# Patient Record
Sex: FEMALE | Race: WHITE
[De-identification: names, ages, dates, MRNs, and addresses within clinical notes are randomized per-mention and may not be internally consistent; named-entity substitution may affect disease eponyms.]

---

## 2021-03-28 ENCOUNTER — HOSPITAL ENCOUNTER (EMERGENCY)
Dept: HOSPITAL 63 - ER | Age: 31
Discharge: HOME | End: 2021-03-28
Payer: COMMERCIAL

## 2021-03-28 VITALS — BODY MASS INDEX: 37.96 KG/M2 | HEIGHT: 68 IN | WEIGHT: 250.45 LBS

## 2021-03-28 VITALS — SYSTOLIC BLOOD PRESSURE: 144 MMHG | DIASTOLIC BLOOD PRESSURE: 95 MMHG

## 2021-03-28 DIAGNOSIS — Z88.1: ICD-10-CM

## 2021-03-28 DIAGNOSIS — Z88.0: ICD-10-CM

## 2021-03-28 DIAGNOSIS — M62.830: ICD-10-CM

## 2021-03-28 DIAGNOSIS — G89.29: ICD-10-CM

## 2021-03-28 DIAGNOSIS — M54.5: Primary | ICD-10-CM

## 2021-03-28 PROCEDURE — 99283 EMERGENCY DEPT VISIT LOW MDM: CPT

## 2021-03-28 NOTE — PHYS DOC
Past History


Past Medical History:  No Pertinent History


 (RACHEL JIMENEZ)


Past Surgical History:  No Surgical History


 (RACHEL JIMENEZ)


Alcohol Use:  None


 (RACHEL JIMENEZ)





General Adult


EDM:


Chief Complaint:  BACK PAIN OR INJURY





HPI:


HPI:





Patient is a 31-year-old female who presents with lower back pain.  Patient st

ates that she was getting her son a bath when she went to stand up she started 

having spasms in her back.  Patient states that she had to crawl to the living 

room and call her mom to come get her to bring her to the emergency room.  

Patient denies taking anything for pain prior to arrival.  Patient was able to 

ambulate on her own into the emergency room.  Patient does have a history of 

chronic back pain.


 (RACHEL JIMENEZ)





Review of Systems:


Review of Systems:


Constitutional:  Denies fever or chills 


Eyes:  Denies change in visual acuity 


HENT:  Denies nasal congestion or sore throat 


Respiratory:  Denies cough or shortness of breath 


Cardiovascular:  Denies chest pain or edema 


GI:  Denies abdominal pain, nausea, vomiting, bloody stools or diarrhea 


: Denies dysuria 


Musculoskeletal: Reports back pain denies joint pain 


Integument:  Denies rash 


Neurologic:  Denies headache, focal weakness or sensory changes 


Endocrine:  Denies polyuria or polydipsia 


Lymphatic:  Denies swollen glands 


Psychiatric:  Denies depression or anxiety


 (RACHEL JIMENEZ)





Allergies:


Allergies:





Allergies








Coded Allergies Type Severity Reaction Last Updated Verified


 


  Penicillins Allergy Unknown  3/28/21 Yes


 


  cefdinir Allergy Unknown  3/28/21 Yes








 (RACHEL JIMENEZ)





Physical Exam:


PE:





Constitutional: Well developed, well nourished, no acute distress, non-toxic 

appearance. []


HENT: Normocephalic, atraumatic, bilateral external ears normal, oropharynx 

moist, no oral exudates, nose normal. []


Eyes: PERRLA, EOMI, conjunctiva normal, no discharge. [] 


Neck: Normal range of motion, no tenderness, supple, no stridor. [] 


Cardiovascular:Heart rate regular rhythm, no murmur []


Lungs & Thorax:  Bilateral breath sounds clear to auscultation []


Abdomen: Bowel sounds normal, soft, no tenderness, no masses, no pulsatile 

masses. [] 


Skin: Warm, dry, no erythema, no rash. [] 


Back: Lower back tenderness, no CVA tenderness. [] 


Extremities: No tenderness, no cyanosis, no clubbing, ROM intact, no edema. [] 


Neurologic: Alert and oriented X 3, normal motor function, normal sensory 

function, no focal deficits noted. []


Psychologic: Affect normal, judgement normal, mood normal. []


 (RACHEL JIMENEZ)





Current Patient Data:


Vital Signs:





                                   Vital Signs








  Date Time  Temp Pulse Resp B/P (MAP) Pulse Ox O2 Delivery O2 Flow Rate FiO2


 


3/28/21 18:24  98 18 144/95 (111) 100   








 (RACHEL JIMENEZ)





EKG:


EKG:


[]


 (RACHEL JIMENEZ)





Radiology/Procedures:


Radiology/Procedures:


[]


 (RACHEL JIMEENZ)





Heart Score:


C/O Chest Pain:  No


Risk Factors:


Risk Factors:  DM, Current or recent (<one month) smoker, HTN, HLP, family 

history of CAD, obesity.


Risk Scores:


Score 0 - 3:  2.5% MACE over next 6 weeks - Discharge Home


Score 4 - 6:  20.3% MACE over next 6 weeks - Admit for Clinical Observation


Score 7 - 10:  72.7% MACE over next 6 weeks - Early Invasive Strategies


 (RACHEL JIMENEZ)





Course & Med Decision Making:


Course & Med Decision Making


Pertinent Labs and Imaging studies reviewed. (See chart for details)





[] Patient is reporting lower back pain and mid back pain.  Patient states that 

she was having spasms and had to crawl from the bathroom to the living room 

floor.  Patient called her mom to bring her to the emergency room.  Patient does

 states she was able to ambulate on her own into the emergency room but was 

still having pain.  Patient given Norflex and Dexamethasone in the ED. Sending 

patient home with Norflex and Medrol dose pack.  


 (RACHEL JIMENEZ)


Course & Med Decision Making


Did not see or evaluate patient.  Agree with NP's work-up and disposition per 

note.


 (RAIMUNDO CORONA MD)


Max Disclaimer:


Dragon Disclaimer:


This electronic medical record was generated, in whole or in part, using a voice

 recognition dictation system.


 (RACHEL JIMENEZ)





Departure


Departure:


Impression:  


   Primary Impression:  


   Back pain


   Qualified Codes:  M54.5 - Low back pain


Disposition:  01 DC HOME SELF CARE/HOMELESS


Condition:  STABLE


Referrals:  


PCP,NO (PCP)


Patient Instructions:  Back Pain in Pregnancy





Additional Instructions:  


You are seen in the emergency room for back pain.  I am sending you home with a 

muscle relaxer and also steroids.  Please return emergency room with worsening 

symptoms or concerns.  Otherwise follow-up with your PCP for further management.





EMERGENCY DEPARTMENT GENERAL DISCHARGE INSTRUCTIONS





Thank you for coming to [facility] (ED) today and trusting us

 with you 


care.  We trust that you had a positivie experience in our Emergency Department.

  If you 


wish to speak to the department management, you may call the director at 

[phone].





YOUR FOLLOW UP INSTRUCTIONS ARE AS FOLLOWS:





1.  Do you have a private Doctor?  If you do not have a private doctor, please 

ask for a 


resource list of physicians or clinics that may be able to assist you with 

follow up care.





2.  The Emergency Physician has interpreted your x-rays.  The X-Ray specialist 

will also 


review them.  If there is a change in the findings, you will be notified in 48 

hours when at 


all possible.





3.  A lab test or culture has been done, your results will be reviewed and you 

will be 


notified if you need a change in treatment.





ADDITIONAL INSTRUCTIONS AND INFORMATION:





1.  Your care today has been supervised by a physician who is specially trained 

in emergency 


care.  Many problems require more than one evaluation for a complete diagnosis 

and 


treatment.  We recommend that you schedule your follow up appointment as 

recommended to 


ensure complete treatment of you illness or injury.  If you are unable to obtain

 follow up 


care and continue to have a problem, or if your condition worsens, we recommend 

that you 


return to the ED.





2.  We are not able to safely determine your condition over the phone nor are we

 able to 


give sound medical advice over the phone.  For these safety reasons, if you call

 for medical 


advice we will ask you to come to the ED for further evaluation.





3.  If you have any questions regarding these discharge instructions please call

 the ED at 


[phone].





SAFETY INFORMATION:





In the interest of safety, wellness, and injury prevention; we encourage you to 

wear your 


sealbelt, if you smoke; quite smoking, and we encourage family to use a 

protective helmet 


for bicycling and other sporting events that present an increased risk for head 

injury.





IF YOUR SYMPTOMS WORSEN OR NEW SYMPTOMS DEVELOP, OR YOU HAVE CONCERNS ABOUT YOUR

 CONDITION; 


OR IF YOUR CONDITION WORSENS WHILE YOU ARE WAITING FOR YOUR FOLLOW UP 

APPOINTMENT; EITHER 


CONTACT YOUR PRIMARY CARE DOCTOR, THE PHYSICIAN WHOSE NAME AND NUMBER YOU WERE DOMINICK OMALLEY, OR 


RETURN TO THE ED IMMEDIATELY.


Scripts


Orphenadrine Citrate (ORPHENADRINE CITRATE) 100 Mg Tablet.er


1 TAB PO BID for pain for 7 Days, #14 TAB 1 Refill


   Prov: RACHEL JIMENEZ         3/28/21 


Methylprednisolone (MEDROL) 4 Mg Tab.ds.pk


1 PKG PO UD for inflammation for 6 Days, #1 PKG 0 Refills


   Prov: RACHEL JIMENEZ         3/28/21











RACHEL JIMENEZ               Mar 28, 2021 18:43


RAIMUNDO CORONA MD               Mar 28, 2021 21:42

## 2021-05-25 ENCOUNTER — HOSPITAL ENCOUNTER (EMERGENCY)
Dept: HOSPITAL 63 - ER | Age: 31
Discharge: HOME | End: 2021-05-25
Payer: COMMERCIAL

## 2021-05-25 VITALS — BODY MASS INDEX: 39.16 KG/M2 | WEIGHT: 258.38 LBS | HEIGHT: 68 IN

## 2021-05-25 VITALS — SYSTOLIC BLOOD PRESSURE: 137 MMHG | DIASTOLIC BLOOD PRESSURE: 93 MMHG

## 2021-05-25 DIAGNOSIS — F32.9: ICD-10-CM

## 2021-05-25 DIAGNOSIS — Z88.0: ICD-10-CM

## 2021-05-25 DIAGNOSIS — F41.9: ICD-10-CM

## 2021-05-25 DIAGNOSIS — Z88.1: ICD-10-CM

## 2021-05-25 DIAGNOSIS — H61.21: Primary | ICD-10-CM

## 2021-05-25 PROCEDURE — 99282 EMERGENCY DEPT VISIT SF MDM: CPT

## 2021-05-25 PROCEDURE — 69209 REMOVE IMPACTED EAR WAX UNI: CPT

## 2021-05-25 NOTE — PHYS DOC
Past History


Past Medical History:  Anxiety, Depression


Past Surgical History:  No Surgical History


Smoking:  Non-smoker


Alcohol Use:  None


Drug Use:  None





General Adult


EDM:


Chief Complaint:  EARACHE/EAR PAIN





HPI:


HPI:





31-year-old female presents with report of decreased hearing out of right ear x5

days.  Denies any fever or chills.  Denies nasal congestion or sore throat.  

Denies trauma.  Patient reports sensation that there is "something in her ear ".





Review of Systems:


Review of Systems:





Constitutional: Denies fever or chills 


Eyes: Denies redness or eye pain 


HENT: Denies nasal congestion or sore throat; reports decreased hearing from 

right ear


Respiratory: Denies cough or shortness of breath 


Cardiovascular: Denies chest pain or palpitations


GI: Denies abdominal pain, nausea, or vomiting


: Denies dysuria or hematuria


Musculoskeletal: Denies back pain or joint pain


Integument: Denies rash or skin lesions 


Neurologic: Denies headache, focal weakness or sensory changes





Complete systems were reviewed and found to be within normal limits, except as 

documented in this note.





Allergies:


Allergies:





Allergies








Coded Allergies Type Severity Reaction Last Updated Verified


 


  Penicillins Allergy Unknown  3/28/21 Yes


 


  cefdinir Allergy Unknown  3/28/21 Yes











Physical Exam:


PE:





Constitutional: Well developed, well nourished, no acute distress, non-toxic 

appearance


HENT: Normocephalic, atraumatic, left TM obscured with wet cerumen, right TM 

with cerumen impaction noted


Eyes: Conjunctiva normal, no discharge


Neck: Normal range of motion, supple


Lungs & Thorax:  No respiratory distress, equal chest rise and fall


Skin: Warm, dry, no erythema, no rash


Neurologic: Alert and oriented X 3, no focal deficits noted


Psychologic: Affect normal, judgment normal





EKG:


EKG:


[]





Radiology/Procedures:


Radiology/Procedures:


[]





Heart Score:


C/O Chest Pain:  N/A





Course & Med Decision Making:


Course & Med Decision Making





Patient presents with HPI and physical exam concern for impacted cerumen of 

right ear.  Ear irrigation with 50-50 mix of hydrogen peroxide and warm water 

performed with removal of large cerumen plug.  Patient reports interval 

improvement of symptoms.





Patient stable for discharge with outpatient follow-up with PCP. Discussed 

findings and plan with patient, who acknowledges understanding and agreement.





Dragon Disclaimer:


Dragon Disclaimer:


This electronic medical record was generated, in whole or in part, using a voice

 recognition dictation system.





Additional Procedures


Progress


Removal of impacted cerumen of right ear





Verbal consent obtained. Time out performed. Hand hygiene utilized.  Right ear 

irrigation performed with 60 cc syringe and 18-gauge Angiocath with 50-50 

mixture of hydrogen peroxide and warm water.  Successful removal of large cer

umen plug achieved.  TM intact.  Patient tolerated procedure well and without 

difficulty.





Departure


Departure:


Impression:  


   Primary Impression:  


   Impacted cerumen of right ear


Disposition:  01 HOME / SELF CARE / HOMELESS


Condition:  STABLE


Referrals:  


PCP,NO (PCP)


Patient Instructions:  Cerumen Impaction





Additional Instructions:  


Discontinue use of qtips in your ears.  Use over the counter Debrox (ear wax 

softner) as needed.











CHING PURI DO             May 25, 2021 20:31

## 2021-06-22 ENCOUNTER — HOSPITAL ENCOUNTER (EMERGENCY)
Dept: HOSPITAL 63 - ER | Age: 31
Discharge: HOME | End: 2021-06-22
Payer: COMMERCIAL

## 2021-06-22 VITALS — SYSTOLIC BLOOD PRESSURE: 137 MMHG | DIASTOLIC BLOOD PRESSURE: 93 MMHG

## 2021-06-22 VITALS — HEIGHT: 68 IN | BODY MASS INDEX: 39.16 KG/M2 | WEIGHT: 258.38 LBS

## 2021-06-22 DIAGNOSIS — F41.9: ICD-10-CM

## 2021-06-22 DIAGNOSIS — F32.9: ICD-10-CM

## 2021-06-22 DIAGNOSIS — Z88.0: ICD-10-CM

## 2021-06-22 DIAGNOSIS — M54.12: Primary | ICD-10-CM

## 2021-06-22 DIAGNOSIS — Z88.1: ICD-10-CM

## 2021-06-22 PROCEDURE — 99284 EMERGENCY DEPT VISIT MOD MDM: CPT

## 2021-06-22 PROCEDURE — 96372 THER/PROPH/DIAG INJ SC/IM: CPT

## 2021-09-17 ENCOUNTER — HOSPITAL ENCOUNTER (EMERGENCY)
Dept: HOSPITAL 63 - ER | Age: 31
Discharge: HOME | End: 2021-09-17
Payer: COMMERCIAL

## 2021-09-17 VITALS — BODY MASS INDEX: 38.96 KG/M2 | WEIGHT: 257.06 LBS | HEIGHT: 68 IN

## 2021-09-17 VITALS — SYSTOLIC BLOOD PRESSURE: 144 MMHG | DIASTOLIC BLOOD PRESSURE: 91 MMHG

## 2021-09-17 DIAGNOSIS — Z88.8: ICD-10-CM

## 2021-09-17 DIAGNOSIS — Z59.0: ICD-10-CM

## 2021-09-17 DIAGNOSIS — Z88.0: ICD-10-CM

## 2021-09-17 DIAGNOSIS — M25.512: Primary | ICD-10-CM

## 2021-09-17 PROCEDURE — 73030 X-RAY EXAM OF SHOULDER: CPT

## 2021-09-17 PROCEDURE — 99283 EMERGENCY DEPT VISIT LOW MDM: CPT

## 2021-09-17 SDOH — ECONOMIC STABILITY - HOUSING INSECURITY: HOMELESSNESS: Z59.0

## 2021-09-17 NOTE — RAD
AP Internal and external rotation views with Y-View of the left shoulder were performed.



Indication: Pain after trauma 

 

Comparison:  None. 

 

No fracture, glenohumeral or AC joint subluxation, or significant degenerative changes are seen.  The
 subacromial space is maintained.

 

Impression:

1. Unremarkable exam of the left shoulder.





Electronically signed by: Marino Rashid MD (9/17/2021 10:53 AM) [id number]

## 2021-09-17 NOTE — PHYS DOC
Past History


Past Medical History:  Anxiety, Depression


Past Surgical History:  No Surgical History


Smoking:  Non-smoker


Alcohol Use:  None


Drug Use:  None





General Adult


EDM:


Chief Complaint:  SHOULDER INJURY





HPI:


HPI:





Patient is a 31 year old female who presents with left shoulder pain.  Started 

1-2 hours ago.  Works at [employer] and was moving 20 pound bags of 

ice when it started to happen.  Pain is in the left trapezius area and radiates 

towards the shoulder and up towards the neck.  Does complain of some tingling in

her bilateral hands.  No weakness, but does have increasing pain with abduction 

of the left shoulder.  No known trauma.  No repetitive motions or overhead 

motions.  Denies having any previous problems with the shoulder.  Denies having 

neck pain/problems.





Review of Systems:


Review of Systems:


Constitutional:  Denies fever or chills 


Eyes:  Denies change in visual acuity 


HENT:  Denies nasal congestion or sore throat 


Respiratory:  Denies cough or shortness of breath 


Cardiovascular:  Denies chest pain or edema 


GI:  Denies abdominal pain, nausea, vomiting, bloody stools or diarrhea 


: Denies dysuria 


Musculoskeletal:  left shoulder pain


Integument:  Denies rash 


Neurologic:  Denies headache, focal weakness or sensory changes 


Endocrine:  Denies polyuria or polydipsia 


Lymphatic:  Denies swollen glands 


Psychiatric:  Denies depression or anxiety





Allergies:


Allergies:





Allergies








Coded Allergies Type Severity Reaction Last Updated Verified


 


  Penicillins Allergy Unknown  3/28/21 Yes


 


  cefdinir Allergy Unknown  3/28/21 Yes











Physical Exam:


PE:





Constitutional: Well developed, well nourished, no acute distress, non-toxic 

appearance. []


HENT: Normocephalic, atraumatic, bilateral external ears normal, oropharynx 

moist, no oral exudates, nose normal. []


Eyes: PERRLA, EOMI, conjunctiva normal, no discharge. [] 


Neck: Normal range of motion, no tenderness, supple, no stridor. [] 


Cardiovascular:Heart rate regular rhythm, no murmur []


Lungs & Thorax:  Bilateral breath sounds clear to auscultation []


Abdomen: Bowel sounds normal, soft, no tenderness, no masses, no pulsatile 

masses. [] 


Skin: Warm, dry, no erythema, no rash. [] 


Back: No tenderness, no CVA tenderness. [] 


Extremities: Tenderness over the left trapezius and deltoid.  Mild bony 

tenderness over the distal clavicle and proximal humerus.. [] 


Neurologic: Alert and oriented X 3, normal motor function, normal sensory 

function, no focal deficits noted.


Specifically 5/5 strength bilaterally:


 strength


Flexion/extension at the elbow


Initiation of abduction


Good strength with full abduction, but complains of pain at approximately 30 

degrees 


5/5 strength on the left in:


Internal and external rotation of the shoulder


[]


Psychologic: Affect normal, judgement normal, mood normal. []





EKG:


EKG:


[]





Radiology/Procedures:


Radiology/Procedures:


[]


Impressions:


                               [facility] [address], [place] [address]


                                 [phone]


                                        


                                 IMAGING REPORT





                                     Signed





PATIENT: JAMILAH STUBBS ACCOUNT: [id number]     MRN#: [id number]


: 1990           LOCATION: ER              AGE: 31


SEX: F                    EXAM DT: 21         ACCESSION#: [id number]


STATUS: PRE ER            ORD. PHYSICIAN: THOM MURPHY MD


REASON: shoulder pain, atraumatic 


PROCEDURE: SHOULDER 2+V LEFT





AP Internal and external rotation views with Y-View of the left shoulder were 

performed.





Indication: Pain after trauma 


 


Comparison:  None. 


 


No fracture, glenohumeral or AC joint subluxation, or significant degenerative 

changes are seen.  The subacromial space is maintained.


 


Impression:


1. Unremarkable exam of the left shoulder.








Electronically signed by: Kelsey Ochoa MD (2021 10:53 AM) [id number]














DICTATED AND SIGNED BY:     KELSEY OCHOA MD


DATE:     21 1053





CC: THOM MURPHY MD; PCP,NO ~MTH0 0





Heart Score:


C/O Chest Pain:  No


Risk Factors:


Risk Factors:  DM, Current or recent (<one month) smoker, HTN, HLP, family 

history of CAD, obesity.


Risk Scores:


Score 0 - 3:  2.5% MACE over next 6 weeks - Discharge Home


Score 4 - 6:  20.3% MACE over next 6 weeks - Admit for Clinical Observation


Score 7 - 10:  72.7% MACE over next 6 weeks - Early Invasive Strategies





Course & Med Decision Making:


Course & Med Decision Making


Pertinent Labs and Imaging studies reviewed. (See chart for details)





Patient a 31-year-old female presents with nontraumatic left shoulder pain after

 moving 20 pound ice bags.


Exam most consistent with trapezius strain versus potentially cervical 

radiculopathy.


Strength intact, does not require neuroimaging of the neck emergently.


We will obtain a plain film of the shoulder to exclude bony process. 


Rotator cuff intact by exam.


No overlying changes to suggest septic joint.


We will plan on conservative treatment with cyclobenzaprine, Tylenol, NSAID.


1048





Dragon Disclaimer:


Dragon Disclaimer:


This electronic medical record was generated, in whole or in part, using a voice

 recognition dictation system.





Departure


Departure:


Impression:  


   Primary Impression:  


   Left shoulder pain


Disposition:   HOME / SELF CARE / HOMELESS


Condition:  STABLE


Referrals:  


PCP,NO (PCP)





Additional Instructions:  


Since you do not have a PCP, please call the number for the [facility] at [phone].





For pain tylenol and ibuprofen are best used on a schedule. Please alternate 

between the two.





-Tylenol 1000 mg every 6 hours (do not exceed 4000 mg in one day)


-Ibuprofen 400-600 mg every 6 hours. Take with food. Do not take for more than 1

 week.





You can also take cyclobenzaprine 10 mg every 8 hours as needed for muscle 

strain/pain.





If you develop high fevers, shaking chills, weakness, or other new/concerning 

symptoms please return to the emergency department for reevaluation.











THOM MURPHY MD              Sep 17, 2021 10:49

## 2021-10-19 ENCOUNTER — HOSPITAL ENCOUNTER (EMERGENCY)
Dept: HOSPITAL 63 - ER | Age: 31
Discharge: HOME | End: 2021-10-19
Payer: COMMERCIAL

## 2021-10-19 VITALS — WEIGHT: 220.46 LBS | BODY MASS INDEX: 33.41 KG/M2 | HEIGHT: 68 IN

## 2021-10-19 VITALS — SYSTOLIC BLOOD PRESSURE: 122 MMHG | DIASTOLIC BLOOD PRESSURE: 76 MMHG

## 2021-10-19 DIAGNOSIS — Z88.0: ICD-10-CM
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
## 2021-10-19 DIAGNOSIS — S60.051A: Primary | ICD-10-CM
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
## 2021-10-19 PROCEDURE — 29130 APPL FINGER SPLINT STATIC: CPT

## 2021-10-19 PROCEDURE — 73140 X-RAY EXAM OF FINGER(S): CPT

## 2021-10-19 NOTE — RAD
EXAM:  3 views right small finger



DATE: 10/19/2021 9:00 PM



INDICATION: Reason: 5TH DIGIT RIGHT HAND, SHIELD ABDOMEN PLEASE / Spl. Instructions:  / History: .



COMPARISON: No Prior



FINDINGS/

IMPRESSION:



No evidence of acute fracture or dislocation. Soft tissue swelling about the right small finger.



Joint spaces are preserved without significant degenerative/proliferative change.



Electronically signed by: Miguel Alonzo MD (10/19/2021 9:05 PM) BELINDA

## 2021-10-19 NOTE — PHYS DOC
Past History


Past Medical History:  Anxiety, Depression


Additional Past Medical Histor:  chronic back pain


 (CLARKE SEVILLA)


Past Surgical History:  Other


Additional Past Surgical Histo:  wisdom teeth removal


 (CLARKE SEVILLA)


Smoking:  Non-smoker


Alcohol Use:  Rarely


Drug Use:  None


 (CLARKE SEVILLA)





General Adult


EDM:


Chief Complaint:  FINGER INJURY





HPI:


HPI:


Patient is a 31-year-old female who presents to the emergency department for 

right fifth finger injury.  Patient reports that 4 days ago she was moving a 

cabinet and smashed her finger between the cabinet and the wall.  She rates her 

pain currently 3 out of 10.  No treatment prior to arrival.  Patient denies any 

decreased sensation to her extremity but does report limited flexion due to pain

and swelling.


 (CLARKE SEVILLA)





Review of Systems:


Review of Systems:





Musculoskeletal: See HPI


Integument: See HPI


Neurologic: See HPI


 (CLARKE SEVILLA)





Allergies:


Allergies:





Allergies








Coded Allergies Type Severity Reaction Last Updated Verified


 


  Penicillins Allergy Unknown  9/17/21 Yes


 


  cefdinir Allergy Unknown  9/17/21 Yes








 (CLARKE SEVILLA)





Physical Exam:


PE:





Constitutional: Well developed, well nourished, no acute distress, non-toxic 

appearance. []


HENT: Normocephalic, atraumatic


Eyes: PERRL, EOMI, conjunctiva normal, no discharge. [] 


Neck: Normal range of motion, no stridor


Cardiovascular: Normal peripheral perfusion


Lungs & Thorax: Normal work of breathing, no tachypnea


Abdomen: Bowel sounds normal, soft, no tenderness, no masses, no pulsatile 

masses. [] 


Skin: Warm, dry, no erythema, no rash. [] 


Back: Normal range of motion


Extremities: No tenderness, no cyanosis, no clubbing, ROM intact, no edema.  

Right fifth finger: Swelling and ecchymosis noted to right fifth finger, limited

 flexion due to pain and swelling, extension intact, neuro intact


Neurologic: Alert and oriented X 3, normal motor function, normal sensory 

function, no focal deficits noted. []


Psychologic: Affect normal, judgement normal, mood normal. []


 (CLARKE SEVILLA)





Current Patient Data:


Vital Signs:





                                   Vital Signs








  Date Time  Temp Pulse Resp B/P (MAP) Pulse Ox O2 Delivery O2 Flow Rate FiO2


 


10/19/21 20:38 98.6 74 20 122/76 (91) 99 Room Air  








 (CLARKE SEVILLA)





EKG:


EKG:


[]


 (CLARKE SEVILLA)





Radiology/Procedures:


Radiology/Procedures:


[]PROCEDURE: FINGER(S) RIGHT





EXAM:  3 views right small finger





DATE: 10/19/2021 9:00 PM





INDICATION: Reason: 5TH DIGIT RIGHT HAND, SHIELD ABDOMEN PLEASE / Spl. 

Instructions:  / History: .





COMPARISON: No Prior





FINDINGS/


IMPRESSION:





No evidence of acute fracture or dislocation. Soft tissue swelling about the rig

ht small finger.





Joint spaces are preserved without significant degenerative/proliferative 

change.





Electronically signed by: Miguel Alonzo MD (10/19/2021 9:05 PM) [facility] [place]














DICTATED AND SIGNED BY:     MIGUEL ALONZO MD


DATE:     10/19/21 2105





CC: EMERGENCY,DEPARTMENT; CLARKE SEVILLA; PCP,NO ~MTH0 0


 (CLARKE SEVILLA)





Heart Score:


C/O Chest Pain:  N/A


Risk Factors:


Risk Factors:  DM, Current or recent (<one month) smoker, HTN, HLP, family 

history of CAD, obesity.


Risk Scores:


Score 0 - 3:  2.5% MACE over next 6 weeks - Discharge Home


Score 4 - 6:  20.3% MACE over next 6 weeks - Admit for Clinical Observation


Score 7 - 10:  72.7% MACE over next 6 weeks - Early Invasive Strategies


 (CLARKE SEVILLA)





Course & Med Decision Making:


Course & Med Decision Making


Pertinent Labs and Imaging studies reviewed. (See chart for details)





Patient presents to the emergency department for right fifth finger injury after

 smashing it 4 days ago between a cabinet normal. No nail bed involvement.  An 

x-ray was performed that showed no acute findings.  Aluminum finger splint 

placed.  Patient advised to wear that for comfort.  She was also advised to take

 Tylenol and ibuprofen for pain.  She can also apply ice.  Follow-up with 

primary care provider within a week.  I discussed with patient all findings and 

diagnostic testing as well as the need to follow-up with PCP for further 

evaluation and treatment or return to the ER if any new or worsening symptoms.  

Strict return precautions were also discussed at length.  Patient voiced 

understanding and agreement with the plan.  Patient is hemodynamically stable at

 the time of disposition.


 (CLARKE SEVILLA)





Dragon Disclaimer:


Dragon Disclaimer:


This electronic medical record was generated, in whole or in part, using a voice

 recognition dictation system.


 (CLARKE SEVILLA)


Attending Co-Sign


The patient was seen and interviewed as well as examined at the bedside. The 

chart was reviewed. The case was discussed. Agree with the plan of care.


 (BLANCO SERVIN DO)





Departure


Departure:


Impression:  


   Primary Impression:  


   Finger contusion


   Qualified Codes:  S60.051A - Contusion of right little finger without damage 

   to nail, initial encounter


Disposition:  01 HOME / SELF CARE / HOMELESS


Condition:  GOOD


Referrals:  


PCP,NO (PCP)


Patient Instructions:  Contusion





Additional Instructions:  


You are seen in the emergency department today for finger injury.  An x-ray was 

performed that showed no acute fracture.  Your finger was placed in aluminum 

finger splint to help with pain.  You can take Tylenol and/or ibuprofen for pain

 at home.  Application of ice may help with swelling.  Follow-up with your 

primary care provider within the week if your pain persists.  If you develop new

 injury or develop worsening of your pain, decreased range of motion, decreased 

sensation or increased swelling please return to the ER.





EMERGENCY DEPARTMENT GENERAL DISCHARGE INSTRUCTIONS





Thank you for coming to [facility] (ED) today and trusting us

 with you 


care.  We trust that you had a positivie experience in our Emergency Department.

  If you 


wish to speak to the department management, you may call the director at 

[phone].





YOUR FOLLOW UP INSTRUCTIONS ARE AS FOLLOWS:





1.  Do you have a private Doctor?  If you do not have a private doctor, please 

ask for a 


resource list of physicians or clinics that may be able to assist you with 

follow up care.





2.  The Emergency Physician has interpreted your x-rays.  The X-Ray specialist 

will also 


review them.  If there is a change in the findings, you will be notified in 48 

hours when at 


all possible.





3.  A lab test or culture has been done, your results will be reviewed and you 

will be 


notified if you need a change in treatment.





ADDITIONAL INSTRUCTIONS AND INFORMATION:





1.  Your care today has been supervised by a physician who is specially trained 

in emergency 


care.  Many problems require more than one evaluation for a complete diagnosis 

and 


treatment.  We recommend that you schedule your follow up appointment as 

recommended to 


ensure complete treatment of you illness or injury.  If you are unable to obtain

 follow up 


care and continue to have a problem, or if your condition worsens, we recommend 

that you 


return to the ED.





2.  We are not able to safely determine your condition over the phone nor are we

 able to 


give sound medical advice over the phone.  For these safety reasons, if you call

 for medical 


advice we will ask you to come to the ED for further evaluation.





3.  If you have any questions regarding these discharge instructions please call

 the ED at 


[phone].





SAFETY INFORMATION:





In the interest of safety, wellness, and injury prevention; we encourage you to 

wear your 


sealbelt, if you smoke; quite smoking, and we encourage family to use a 

protective helmet 


for bicycling and other sporting events that present an increased risk for head 

injury.





IF YOUR SYMPTOMS WORSEN OR NEW SYMPTOMS DEVELOP, OR YOU HAVE CONCERNS ABOUT YOUR

 CONDITION; 


OR IF YOUR CONDITION WORSENS WHILE YOU ARE WAITING FOR YOUR FOLLOW UP 

APPOINTMENT; EITHER 


CONTACT YOUR PRIMARY CARE DOCTOR, THE PHYSICIAN WHOSE NAME AND NUMBER YOU WERE 

GIVEN, OR 


RETURN TO THE ED IMMEDIATELY.











CLARKE SEVILLA          Oct 19, 2021 20:50


BLANCO SERVIN DO                 Oct 21, 2021 14:16

## 2021-11-12 ENCOUNTER — HOSPITAL ENCOUNTER (EMERGENCY)
Dept: HOSPITAL 63 - ER | Age: 31
Discharge: HOME | End: 2021-11-12
Payer: COMMERCIAL

## 2021-11-12 VITALS — WEIGHT: 246.92 LBS | HEIGHT: 68 IN | BODY MASS INDEX: 37.42 KG/M2

## 2021-11-12 VITALS — DIASTOLIC BLOOD PRESSURE: 73 MMHG | SYSTOLIC BLOOD PRESSURE: 130 MMHG

## 2021-11-12 DIAGNOSIS — Z88.0: ICD-10-CM

## 2021-11-12 DIAGNOSIS — R07.89: Primary | ICD-10-CM

## 2021-11-12 LAB
ALBUMIN SERPL-MCNC: 4 G/DL (ref 3.4–5)
ALBUMIN/GLOB SERPL: 1.1 {RATIO} (ref 1–1.7)
ALP SERPL-CCNC: 113 U/L (ref 46–116)
ALT SERPL-CCNC: 60 U/L (ref 14–59)
AMORPH SED URNS QL MICRO: PRESENT /HPF
AMPHETAMINE/METHAMPHETAMINE: (no result)
ANION GAP SERPL CALC-SCNC: 6 MMOL/L (ref 6–14)
APTT PPP: YELLOW S
AST SERPL-CCNC: 29 U/L (ref 15–37)
BACTERIA #/AREA URNS HPF: (no result) /HPF
BARBITURATES UR-MCNC: (no result) UG/ML
BASOPHILS # BLD AUTO: 0.1 X10^3/UL (ref 0–0.2)
BASOPHILS NFR BLD: 1 % (ref 0–3)
BENZODIAZ UR-MCNC: (no result) UG/L
BILIRUB SERPL-MCNC: 0.4 MG/DL (ref 0.2–1)
BILIRUB UR QL STRIP: (no result)
BUN/CREAT SERPL: 10 (ref 6–20)
CA-I SERPL ISE-MCNC: 10 MG/DL (ref 7–20)
CALCIUM SERPL-MCNC: 9.8 MG/DL (ref 8.5–10.1)
CANNABINOIDS UR-MCNC: (no result) UG/L
CHLORIDE SERPL-SCNC: 99 MMOL/L (ref 98–107)
CO2 SERPL-SCNC: 29 MMOL/L (ref 21–32)
COCAINE UR-MCNC: (no result) NG/ML
CREAT SERPL-MCNC: 1 MG/DL (ref 0.6–1)
EOSINOPHIL NFR BLD: 0.2 X10^3/UL (ref 0–0.7)
EOSINOPHIL NFR BLD: 2 % (ref 0–3)
ERYTHROCYTE [DISTWIDTH] IN BLOOD BY AUTOMATED COUNT: 14.3 % (ref 11.5–14.5)
FIBRINOGEN PPP-MCNC: (no result) MG/DL
GFR SERPLBLD BASED ON 1.73 SQ M-ARVRAT: 65.1 ML/MIN
GLOBULIN SER-MCNC: 3.6 G/DL (ref 2.2–3.8)
GLUCOSE SERPL-MCNC: 98 MG/DL (ref 70–99)
GLUCOSE UR STRIP-MCNC: (no result) MG/DL
HCT VFR BLD CALC: 38.3 % (ref 36–47)
HGB BLD-MCNC: 12.9 G/DL (ref 12–15.5)
LYMPHOCYTES # BLD: 2.1 X10^3/UL (ref 1–4.8)
LYMPHOCYTES NFR BLD AUTO: 23 % (ref 24–48)
MCH RBC QN AUTO: 27 PG (ref 25–35)
MCHC RBC AUTO-ENTMCNC: 34 G/DL (ref 31–37)
MCV RBC AUTO: 79 FL (ref 79–100)
METHADONE SERPL-MCNC: (no result) NG/ML
MONO #: 0.6 X10^3/UL (ref 0–1.1)
MONOCYTES NFR BLD: 7 % (ref 0–9)
NEUT #: 6.1 X10^3UL (ref 1.8–7.7)
NEUTROPHILS NFR BLD AUTO: 67 % (ref 31–73)
NITRITE UR QL STRIP: (no result)
OPIATES UR-MCNC: (no result) NG/ML
PCP SERPL-MCNC: (no result) MG/DL
PLATELET # BLD AUTO: 353 X10^3/UL (ref 140–400)
POTASSIUM SERPL-SCNC: 4 MMOL/L (ref 3.5–5.1)
PROT SERPL-MCNC: 7.6 G/DL (ref 6.4–8.2)
RBC # BLD AUTO: 4.86 X10^6/UL (ref 3.5–5.4)
RBC #/AREA URNS HPF: 0 /HPF (ref 0–2)
SODIUM SERPL-SCNC: 134 MMOL/L (ref 136–145)
SP GR UR STRIP: 1.02
SQUAMOUS #/AREA URNS LPF: (no result) /LPF
UROBILINOGEN UR-MCNC: 4 MG/DL
WBC # BLD AUTO: 9 X10^3/UL (ref 4–11)
WBC #/AREA URNS HPF: (no result) /HPF (ref 0–4)

## 2021-11-12 PROCEDURE — 93005 ELECTROCARDIOGRAM TRACING: CPT

## 2021-11-12 PROCEDURE — 85025 COMPLETE CBC W/AUTO DIFF WBC: CPT

## 2021-11-12 PROCEDURE — 81025 URINE PREGNANCY TEST: CPT

## 2021-11-12 PROCEDURE — 87086 URINE CULTURE/COLONY COUNT: CPT

## 2021-11-12 PROCEDURE — 71045 X-RAY EXAM CHEST 1 VIEW: CPT

## 2021-11-12 PROCEDURE — 84484 ASSAY OF TROPONIN QUANT: CPT

## 2021-11-12 PROCEDURE — 81001 URINALYSIS AUTO W/SCOPE: CPT

## 2021-11-12 PROCEDURE — 80053 COMPREHEN METABOLIC PANEL: CPT

## 2021-11-12 PROCEDURE — 36415 COLL VENOUS BLD VENIPUNCTURE: CPT

## 2021-11-12 PROCEDURE — 80307 DRUG TEST PRSMV CHEM ANLYZR: CPT

## 2021-11-12 NOTE — PHYS DOC
Past History


Past Medical History:  Anxiety, Depression


Additional Past Medical Histor:  chronic back pain


Past Surgical History:  Other


Additional Past Surgical Histo:  wisdom teeth removal


Smoking:  Non-smoker


Alcohol Use:  Rarely


Drug Use:  None





General Adult


EDM:


Chief Complaint:  CHEST PAIN





HPI:


HPI:


31-year-old female presents with chest pain.  She was standing at work talking 

to a customer when she started to feel a chest pressure which radiated to her 

left arm and up into her jaw.  It was a 7 out of 10 at that time.  She felt like

she needed to sit down and she never sits down to work.  She had a general 

feeling of fatigue and shortness of breath.  This made her concerned and she 

decided to come to the emergency room.  Patient has depression and generalized 

anxiety at baseline.  She has family history of cardiac problems.  Patient has 

been diagnosed with high blood pressure but is not on medication.  Her chest 

pain is currently a 2.  She denies fever or chills.  She was feeling well prior 

to this episode.





Review of Systems:


Review of Systems:


Constitutional:  Denies fever or chills 


Eyes:  Denies change in visual acuity 


HENT:  Denies nasal congestion or sore throat 


Respiratory:  shortness of breath 


Cardiovascular: Chest pain


GI:  Denies abdominal pain, nausea, vomiting, bloody stools or diarrhea 


: Denies dysuria 


Musculoskeletal:  Denies back pain or joint pain 


Integument:  Denies rash 


Neurologic:  Denies headache, focal weakness or sensory changes 


Endocrine:  Denies polyuria or polydipsia 


Lymphatic:  Denies swollen glands 


Psychiatric:   anxiety





Allergies:


Allergies:





Allergies








Coded Allergies Type Severity Reaction Last Updated Verified


 


  Penicillins Allergy Unknown  9/17/21 Yes


 


  cefdinir Allergy Unknown  9/17/21 Yes











Physical Exam:


PE:





Constitutional: Well developed, well nourished, obese, no acute distress, non-

toxic appearance. []


HENT: Normocephalic, atraumatic, bilateral external ears normal, oropharynx 

moist, no oral exudates, nose normal. []


Eyes: PERRLA, EOMI, conjunctiva normal, no discharge. [] 


Neck: Normal range of motion, no tenderness, supple, no stridor. [] 


Cardiovascular: Heart rate regular rhythm, no murmur []


Lungs & Thorax:  Bilateral breath sounds clear to auscultation []


Abdomen: Bowel sounds normal, soft, no tenderness, no masses, no pulsatile 

masses. [] 


Skin: Warm, dry, no erythema, no rash. [] 


Back: No tenderness, no CVA tenderness. [] 


Extremities: No tenderness, no cyanosis, no clubbing, ROM intact, no edema. [] 


Neurologic: Alert and oriented X 3, normal motor function, normal sensory 

function, no focal deficits noted. []


Psychologic: Affect normal, judgement normal, mood anxious. []





EKG:


EKG:


Sinus rhythm, rate 87, normal axis, no ST elevation or depression.  []





Radiology/Procedures:


Radiology/Procedures:


[]





Heart Score:


C/O Chest Pain:  Yes


HEART Score for Chest Pain:  








HEART Score for Chest Pain Response (Comments) Value


 


History Slighlty/Non-Suspicious 0


 


ECG Normal 0


 


Age < 45 0


 


Risk Factors                            1 or 2 Risk Factors 1


 


Troponin < Normal Limit 0


 


Total  1








Risk Factors:


Risk Factors:  DM, Current or recent (<one month) smoker, HTN, HLP, family 

history of CAD, obesity.


Risk Scores:


Score 0 - 3:  2.5% MACE over next 6 weeks - Discharge Home


Score 4 - 6:  20.3% MACE over next 6 weeks - Admit for Clinical Observation


Score 7 - 10:  72.7% MACE over next 6 weeks - Early Invasive Strategies





Course & Med Decision Making:


Course & Med Decision Making


Pertinent Labs and Imaging studies reviewed. (See chart for details)


The patient's labs are unremarkable.  Her EKG is unremarkable.  Her troponin is 

negative.  Her chest x-ray is negative for acute findings.  Her heart score is a

 1.  I am not sure exactly what happened with the patient's work, but it was 

likely made worse by her baseline anxiety.  I have advised she follow-up with 

her primary care physician if she continues to have episodes.  If she has 

worsening symptoms or new symptoms she is welcome to return the emergency room. 

 She is stable for discharge at this time.


[]





Dragon Disclaimer:


Dragon Disclaimer:


This electronic medical record was generated, in whole or in part, using a voice

 recognition dictation system.





Departure


Departure:


Impression:  


   Primary Impression:  


   Chest pain


   Qualified Codes:  R07.9 - Chest pain, unspecified


Disposition:  01 HOME / SELF CARE / HOMELESS


Condition:  STABLE


Referrals:  


PCP,NO (PCP)


Patient Instructions:  Chest Pain (Nonspecific), Easy-to-Read











BLANCO SERVIN DO                 Nov 12, 2021 09:50

## 2021-11-12 NOTE — EKG
[facility] [address], [place] [address]

Test Date:    2021               Test Time:    09:26:49

Pat Name:     JAMILAH STUBBS           Department:   

Patient ID:   [id number]           Room:          

Gender:       F                        Technician:   

:          [id number]               Requested By: BLANCO SERVIN

Order Number: [id number]            Reading MD:   Andrew Maya MD

                                 Measurements

Intervals                              Axis          

Rate:         87                       P:            28

IL:           136                      QRS:          51

QRSD:         92                       T:            18

QT:           360                                    

QTc:          434                                    

                           Interpretive Statements

SINUS RHYTHM

Electronically Signed On 2021 13:22:18 CST by Andrew Maya MD

## 2021-11-12 NOTE — RAD
Single view of the chest. 11/12/2021 9:43 AM



Indication: Reason: CHEST PAIN / Spl. Instructions:  / History: 



Comparison: None



Findings: There is no focal consolidation. There is no pleural effusion or pneumothorax. The cardiome
diastinal silhouette and pulmonary vasculature are within normal limits. No acute osseous abnormaliti
es are seen.



Impression: No evidence of acute cardiopulmonary process. 



Electronically signed by: Stu Whitley MD (11/12/2021 10:15 AM) [id number]